# Patient Record
Sex: MALE | Race: WHITE | NOT HISPANIC OR LATINO | URBAN - METROPOLITAN AREA
[De-identification: names, ages, dates, MRNs, and addresses within clinical notes are randomized per-mention and may not be internally consistent; named-entity substitution may affect disease eponyms.]

---

## 2024-03-03 ENCOUNTER — EMERGENCY (EMERGENCY)
Facility: HOSPITAL | Age: 29
LOS: 1 days | Discharge: HOME | End: 2024-03-03
Attending: EMERGENCY MEDICINE
Payer: COMMERCIAL

## 2024-03-03 VITALS
OXYGEN SATURATION: 98 % | TEMPERATURE: 98 F | RESPIRATION RATE: 18 BRPM | DIASTOLIC BLOOD PRESSURE: 67 MMHG | SYSTOLIC BLOOD PRESSURE: 128 MMHG | WEIGHT: 165.35 LBS | HEIGHT: 65 IN | HEART RATE: 82 BPM

## 2024-03-03 PROCEDURE — 29130 APPL FINGER SPLINT STATIC: CPT | Mod: F7

## 2024-03-03 PROCEDURE — 99284 EMERGENCY DEPT VISIT MOD MDM: CPT | Mod: 25

## 2024-03-03 PROCEDURE — 73130 X-RAY EXAM OF HAND: CPT

## 2024-03-03 PROCEDURE — 73130 X-RAY EXAM OF HAND: CPT | Mod: 26,LT,RT

## 2024-03-03 PROCEDURE — 99283 EMERGENCY DEPT VISIT LOW MDM: CPT

## 2024-03-03 RX ORDER — IBUPROFEN 200 MG
600 TABLET ORAL ONCE
Refills: 0 | Status: COMPLETED | OUTPATIENT
Start: 2024-03-03 | End: 2024-03-03

## 2024-03-03 RX ADMIN — Medication 600 MILLIGRAM(S): at 16:36

## 2024-03-03 NOTE — ED PROVIDER NOTE - CLINICAL SUMMARY MEDICAL DECISION MAKING FREE TEXT BOX
28M with injury to right 3rd digit and left 5th digit while playing soccer 1 week ago.  XR with ?ligamentous injury. no fxs. to fu with hand. given return precautions

## 2024-03-03 NOTE — ED PROVIDER NOTE - PATIENT PORTAL LINK FT
You can access the FollowMyHealth Patient Portal offered by [facility] by registering at the following website: [email or web address]. By joining [facility]’s FollowMyHealth portal, you will also be able to view your health information using other applications (apps) compatible with our system.

## 2024-03-03 NOTE — ED PROVIDER NOTE - ATTENDING APP SHARED VISIT CONTRIBUTION OF CARE
finder injury 1-2 weeks ago during soccer game  pe sig + flexion deformity no mario ttp  xrays no fx /dislocation  agree splint w oputpt hand fu

## 2024-03-03 NOTE — ED ADULT NURSE NOTE - CHIEF COMPLAINT QUOTE
left sided weakness, reduced ROM
pt playing soccer a week ago, c/o pain & swelling to right middle finger & left pinky finger  A&Ox3, resp wnl, fingers noted to be swollen

## 2024-03-03 NOTE — ED PROVIDER NOTE - PHYSICAL EXAMINATION
Gen: No acute distress, non toxic  Neuro: A&O x 3, moving all 4 extremities.   MSK: right 3rd digit with PIP swelling and ttp. painful ROM. left 5th digit swelling to PIP with painful ROM. no discoloration or erythema.   Skin: No rashes. intact and perfused.

## 2024-03-03 NOTE — ED PROVIDER NOTE - OBJECTIVE STATEMENT
28M with no pmh presenting with pain to right 3rd digit and left 5th digit. Pt states he was playing [employer] [place] [employer] 1 week ago when someone kicked the ball into his fingers. states the fingers were not visibly dislocated but he pulled on then just incase. States his fingers have been swollen and painful since the injury.   Denies other injuries.

## 2024-03-03 NOTE — ED ADULT TRIAGE NOTE - CHIEF COMPLAINT QUOTE
pt playing soccer a week ago, c/o pain & swelling to right middle finger & left pinky finger  A&Ox3, resp wnl, fingers noted to be swollen

## 2024-03-03 NOTE — ED PROVIDER NOTE - CARE PROVIDER_API CALL
Gui Abbott  Orthopaedic Surgery  [address] [place] [address]  Phone: [phone]  Fax: [phone]  Follow Up Time:

## 2024-03-07 PROBLEM — Z00.00 ENCOUNTER FOR PREVENTIVE HEALTH EXAMINATION: Status: ACTIVE | Noted: 2024-03-07

## 2024-03-12 ENCOUNTER — APPOINTMENT (OUTPATIENT)
Dept: ORTHOPEDIC SURGERY | Facility: CLINIC | Age: 29
End: 2024-03-12
Payer: COMMERCIAL

## 2024-03-12 VITALS
BODY MASS INDEX: 26.37 KG/M2 | HEIGHT: 67 IN | TEMPERATURE: 98.1 F | SYSTOLIC BLOOD PRESSURE: 116 MMHG | DIASTOLIC BLOOD PRESSURE: 80 MMHG | HEART RATE: 64 BPM | WEIGHT: 168 LBS

## 2024-03-12 DIAGNOSIS — F17.200 NICOTINE DEPENDENCE, UNSPECIFIED, UNCOMPLICATED: ICD-10-CM

## 2024-03-12 DIAGNOSIS — M25.649 STIFFNESS OF UNSPECIFIED HAND, NOT ELSEWHERE CLASSIFIED: ICD-10-CM

## 2024-03-12 DIAGNOSIS — M79.643 PAIN IN UNSPECIFIED HAND: ICD-10-CM

## 2024-03-12 DIAGNOSIS — Z78.9 OTHER SPECIFIED HEALTH STATUS: ICD-10-CM

## 2024-03-12 DIAGNOSIS — M19.049 PRIMARY OSTEOARTHRITIS, UNSPECIFIED HAND: ICD-10-CM

## 2024-03-12 DIAGNOSIS — M79.646 PAIN IN UNSPECIFIED HAND: ICD-10-CM

## 2024-03-12 PROCEDURE — 20600 DRAIN/INJ JOINT/BURSA W/O US: CPT | Mod: F7,F4

## 2024-03-12 PROCEDURE — 99204 OFFICE O/P NEW MOD 45 MIN: CPT | Mod: 25

## 2024-03-12 PROCEDURE — 73130 X-RAY EXAM OF HAND: CPT | Mod: LT,RT

## 2024-03-12 NOTE — ASSESSMENT
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  We have discussed treatment modalities I do recommend hand therapy and he elects for injections   The patient was adequately and thoroughly informed of my assessment of their current condition(s).  - This may diminish bodily function for the extremity. We discussed prognosis, tx modalities including operative and nonoperative options for the above diagnostic assessment. As always, 2nd opinion is always provided as an option.  When accessible, I was able to review other physicians note(s) including reviewing other tests, imaging results as well as personally view these results for my own interpretation.   Injection:   The risks and benefits of a steroid injection were discussed in detail. The risks include but are not limited to: pain, infection, swelling, flare response, bleeding, subcutaneous fat atrophy, skin depigmentation and/or elevation of blood sugar. The risk of incomplete resolution of symptoms, recurrence and additional intervention was reviewed and considered by the patient. The patient agreed to proceed and under a sterile prep, I injected 1 unit 6mg into 1 cc of a combination of Celestone and Lidocaine into the left small PIP joint, right middle PIP joint. The patient tolerated the injection well. The patient was adequately and thoroughly informed of my assessment of their current condition(s).  DISCUSSION: 1.  Injections as above.  OT prescription provided. 2. [x] 3. [x]

## 2024-03-12 NOTE — PHYSICAL EXAM
[de-identified] : Examination of bilateral hands reveals PIP joint deformity in multiple fingers most pronounced in right middle PIP and left small PIP.  There is significant loss of motion with both active and passive motion.  Joints are stable on assessment [de-identified] : [4] views of [bilateral hands and wrists] were obtained today in my office and were seen by me and discussed with the patient.  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

## 2024-03-12 NOTE — HISTORY OF PRESENT ILLNESS
[FreeTextEntry1] : Royce is a 28-year-old male who presents today with what appears to be a greater than 1 year history of chronic worsening small joint pain and arthritic type changes specifically left small and right middle finger with significant stiffness difficulty with range of motion.  He does describe injuries while playing sports throughout this time.